# Patient Record
Sex: MALE | Race: BLACK OR AFRICAN AMERICAN | NOT HISPANIC OR LATINO | Employment: FULL TIME | URBAN - METROPOLITAN AREA
[De-identification: names, ages, dates, MRNs, and addresses within clinical notes are randomized per-mention and may not be internally consistent; named-entity substitution may affect disease eponyms.]

---

## 2017-01-12 ENCOUNTER — HOSPITAL ENCOUNTER (OUTPATIENT)
Dept: CARDIOLOGY | Facility: HOSPITAL | Age: 27
Discharge: HOME | End: 2017-01-12
Attending: FAMILY MEDICINE | Admitting: FAMILY MEDICINE

## 2017-01-12 VITALS
DIASTOLIC BLOOD PRESSURE: 72 MMHG | BODY MASS INDEX: 30.16 KG/M2 | WEIGHT: 235 LBS | SYSTOLIC BLOOD PRESSURE: 123 MMHG | HEIGHT: 74 IN

## 2017-01-12 PROCEDURE — 93306 TTE W/DOPPLER COMPLETE: CPT

## 2017-01-12 PROCEDURE — 93306 TTE W/DOPPLER COMPLETE: CPT | Performed by: INTERNAL MEDICINE

## 2017-01-16 DIAGNOSIS — R93.1 ABNORMAL ECHOCARDIOGRAM: Primary | ICD-10-CM

## 2017-02-20 ENCOUNTER — OFFICE VISIT (OUTPATIENT)
Dept: CARDIOLOGY | Facility: CLINIC | Age: 27
End: 2017-02-20

## 2017-02-20 VITALS
BODY MASS INDEX: 31.93 KG/M2 | DIASTOLIC BLOOD PRESSURE: 74 MMHG | SYSTOLIC BLOOD PRESSURE: 122 MMHG | HEART RATE: 56 BPM | HEIGHT: 74 IN | WEIGHT: 248.8 LBS

## 2017-02-20 DIAGNOSIS — I34.1 MVP (MITRAL VALVE PROLAPSE): Primary | ICD-10-CM

## 2017-02-20 PROCEDURE — 99203 OFFICE O/P NEW LOW 30 MIN: CPT | Performed by: INTERNAL MEDICINE

## 2017-02-20 PROCEDURE — 93000 ELECTROCARDIOGRAM COMPLETE: CPT | Performed by: INTERNAL MEDICINE

## 2017-02-20 NOTE — PROGRESS NOTES
Subjective:     Encounter Date:02/20/2017      Patient ID: Laron Huddleston is a 26 y.o. male.    Chief Complaint:Mitral Valve Prolapse (Consult )    PROBLEM LIST:  1. Mitral valve prolapse  a. 1/12/17 EF=55% left ventricle borderline dilated. Minimal billowing of the mitral valve anteroseptal leaflet with trace MR. Mild TR.  2. Asthma, childhood  3. Surgical history:  a. [place] teeth extraction  b. Left knee surgery      Allergies   Allergen Reactions   • Augmentin [Amoxicillin-Pot Clavulanate] Hives       No current outpatient prescriptions on file.        History of Present Illness    Patient presents today first option of cardiac care for mitral valve prolapse.  He notes that last year during a bout of dehydration he was diagnosed with mitral valve prolapse.  Overall he denies any significant cardiac symptoms.  Denies any chest pain, shortness of breath, syncope, near-syncope, edema, or orthopnea.  Notes that he is able to proceed with his usual activities without any limitations.  He had an echocardiogram performed earlier this year as noted above.  Very active no cardiovascular complaints.  The following portions of the patient's history were reviewed and updated as appropriate: allergies, current medications, past family history, past medical history, past social history, past surgical history and problem list.    Family History   Problem Relation Age of Onset   • No Known Problems Mother    • No Known Problems Father        Social History   Substance Use Topics   • Smoking status: Never Smoker   • Smokeless tobacco: Never Used   • Alcohol use No         Review of Systems   Constitution: Negative for fever, weakness and malaise/fatigue.   HENT: Negative for headaches and nosebleeds.    Eyes: Negative for redness and visual disturbance.   Cardiovascular: Negative for orthopnea, palpitations and paroxysmal nocturnal dyspnea.   Respiratory: Negative for cough, snoring, sputum production and wheezing.   "  Hematologic/Lymphatic: Negative for bleeding problem.   Skin: Negative for flushing, itching and rash.   Musculoskeletal: Negative for falls, joint pain and muscle cramps.   Gastrointestinal: Negative for abdominal pain, diarrhea, heartburn, nausea and vomiting.   Genitourinary: Negative for hematuria.   Neurological: Negative for excessive daytime sleepiness, dizziness and tremors.   Psychiatric/Behavioral: Negative for substance abuse. The patient is not nervous/anxious.           Objective:    height is 74\" (188 cm) and weight is 248 lb 12.8 oz (113 kg). His blood pressure is 122/74 and his pulse is 56.         Physical Exam   Constitutional: He is oriented to person, place, and time. He appears well-developed and well-nourished.   HENT:   Head: Normocephalic and atraumatic.   Mouth/Throat: Oropharynx is clear and moist.   Eyes: Conjunctivae are normal. Pupils are equal, round, and reactive to light.   Neck: Normal carotid pulses and no JVD present. Carotid bruit is not present. No thyromegaly present.   Cardiovascular: Normal rate, regular rhythm, S1 normal and S2 normal.  Exam reveals no gallop and no friction rub.    No murmur heard.  Pulses:       Carotid pulses are 2+ on the right side, and 2+ on the left side.       Dorsalis pedis pulses are 2+ on the right side, and 2+ on the left side.        Posterior tibial pulses are 2+ on the right side, and 2+ on the left side.   Pulmonary/Chest: No respiratory distress. He has no wheezes. He has no rales. He exhibits no tenderness.   Abdominal: He exhibits no distension, no abdominal bruit and no mass. There is no hepatosplenomegaly. There is no tenderness. There is no rebound.   Musculoskeletal: He exhibits no edema, tenderness or deformity.   Lymphadenopathy:     He has no cervical adenopathy.   Neurological: He is alert and oriented to person, place, and time. He has normal strength.   Skin: Skin is warm and dry. No rash noted. No cyanosis. Nails show no " clubbing.   Psychiatric: He has a normal mood and affect. Cognition and memory are normal.         ECG 12 Lead  Date/Time: 2/20/2017 5:30 PM  Performed by: DEN BLAND  Authorized by: DEN BLAND   Rhythm: sinus rhythm  ST Segments: ST segments normal  Clinical impression: normal ECG                  Assessment:   Assessment/Plan      Laron was seen today for mitral valve prolapse.    Diagnoses and all orders for this visit:    MVP (mitral valve prolapse)    Other orders  -     ECG 12 Lead      1.  Mitral valve prolapse.  Mild/trace regurgitation by echocardiogram.  Had a long discussion with the patient, and his mother today regarding the etiology, natural history, and prognosis of mitral valve prolapse.  We discussed no real treatment is necessary at this time given his asymptomatic status, and the fact that his valve is not look particularly thickened or myxomatous.  We have suggested he gets an echocardiogram in 3-5 years, have discussed the symptoms to look out for should he develop worsening mitral regurgitation.  He understands that by the morphology of his valve, which the echo was reviewed by myself today, he has a relatively benign prognosis.  Nonetheless this would still kelly some intermittent follow-up over his lifetime.  Appropriate physical activities were discussed. We will see him back in 3 years time or when necessary symptom change       Please note that portions of this note may have been completed with a voice recognition program. Efforts were made to edit the dictations, but occasionally words are mistranscribed.

## 2017-12-18 ENCOUNTER — OFFICE VISIT (OUTPATIENT)
Dept: FAMILY MEDICINE CLINIC | Facility: CLINIC | Age: 27
End: 2017-12-18

## 2017-12-18 VITALS
DIASTOLIC BLOOD PRESSURE: 76 MMHG | OXYGEN SATURATION: 98 % | BODY MASS INDEX: 30.42 KG/M2 | WEIGHT: 237 LBS | HEIGHT: 74 IN | HEART RATE: 70 BPM | SYSTOLIC BLOOD PRESSURE: 120 MMHG

## 2017-12-18 DIAGNOSIS — Z00.00 HEALTHCARE MAINTENANCE: Primary | ICD-10-CM

## 2017-12-18 PROBLEM — I34.1 MVP (MITRAL VALVE PROLAPSE): Status: RESOLVED | Noted: 2017-02-20 | Resolved: 2017-12-18

## 2017-12-18 LAB
25(OH)D3 SERPL-MCNC: 8.4 NG/ML
ALBUMIN SERPL-MCNC: 4.5 G/DL (ref 3.2–4.8)
ALBUMIN/GLOB SERPL: 1.6 G/DL (ref 1.5–2.5)
ALP SERPL-CCNC: 117 U/L (ref 25–100)
ALT SERPL W P-5'-P-CCNC: 34 U/L (ref 7–40)
ANION GAP SERPL CALCULATED.3IONS-SCNC: 8 MMOL/L (ref 3–11)
ARTICHOKE IGE QN: 127 MG/DL (ref 0–130)
AST SERPL-CCNC: 31 U/L (ref 0–33)
BASOPHILS # BLD AUTO: 0.01 10*3/MM3 (ref 0–0.2)
BASOPHILS NFR BLD AUTO: 0.1 % (ref 0–1)
BILIRUB BLD-MCNC: NEGATIVE MG/DL
BILIRUB SERPL-MCNC: 0.5 MG/DL (ref 0.3–1.2)
BUN BLD-MCNC: 15 MG/DL (ref 9–23)
BUN/CREAT SERPL: 12.5 (ref 7–25)
CALCIUM SPEC-SCNC: 9.5 MG/DL (ref 8.7–10.4)
CHLORIDE SERPL-SCNC: 105 MMOL/L (ref 99–109)
CHOLEST SERPL-MCNC: 199 MG/DL (ref 0–200)
CLARITY, POC: CLEAR
CO2 SERPL-SCNC: 25 MMOL/L (ref 20–31)
COLOR UR: YELLOW
CREAT BLD-MCNC: 1.2 MG/DL (ref 0.6–1.3)
CRP SERPL-MCNC: <0.01 MG/DL (ref 0–1)
DEPRECATED RDW RBC AUTO: 36.9 FL (ref 37–54)
EOSINOPHIL # BLD AUTO: 0.1 10*3/MM3 (ref 0–0.3)
EOSINOPHIL NFR BLD AUTO: 1.1 % (ref 0–3)
ERYTHROCYTE [DISTWIDTH] IN BLOOD BY AUTOMATED COUNT: 12.4 % (ref 11.3–14.5)
GFR SERPL CREATININE-BSD FRML MDRD: 89 ML/MIN/1.73
GLOBULIN UR ELPH-MCNC: 2.9 GM/DL
GLUCOSE BLD-MCNC: 102 MG/DL (ref 70–100)
GLUCOSE UR STRIP-MCNC: NEGATIVE MG/DL
HBA1C MFR BLD: 5.8 % (ref 4.8–5.6)
HCT VFR BLD AUTO: 44.9 % (ref 38.9–50.9)
HDLC SERPL-MCNC: 39 MG/DL (ref 40–60)
HGB BLD-MCNC: 14.6 G/DL (ref 13.1–17.5)
HIV1+2 AB SER QL: NORMAL
IMM GRANULOCYTES # BLD: 0.03 10*3/MM3 (ref 0–0.03)
IMM GRANULOCYTES NFR BLD: 0.3 % (ref 0–0.6)
KETONES UR QL: NEGATIVE
LEUKOCYTE EST, POC: NEGATIVE
LYMPHOCYTES # BLD AUTO: 2.94 10*3/MM3 (ref 0.6–4.8)
LYMPHOCYTES NFR BLD AUTO: 31 % (ref 24–44)
MCH RBC QN AUTO: 26.6 PG (ref 27–31)
MCHC RBC AUTO-ENTMCNC: 32.5 G/DL (ref 32–36)
MCV RBC AUTO: 81.9 FL (ref 80–99)
MONOCYTES # BLD AUTO: 0.56 10*3/MM3 (ref 0–1)
MONOCYTES NFR BLD AUTO: 5.9 % (ref 0–12)
NEUTROPHILS # BLD AUTO: 5.83 10*3/MM3 (ref 1.5–8.3)
NEUTROPHILS NFR BLD AUTO: 61.6 % (ref 41–71)
NITRITE UR-MCNC: NEGATIVE MG/ML
PH UR: 7 [PH] (ref 5–8)
PLATELET # BLD AUTO: 151 10*3/MM3 (ref 150–450)
PMV BLD AUTO: 11.2 FL (ref 6–12)
POTASSIUM BLD-SCNC: 4.3 MMOL/L (ref 3.5–5.5)
PROT SERPL-MCNC: 7.4 G/DL (ref 5.7–8.2)
PROT UR STRIP-MCNC: NEGATIVE MG/DL
RBC # BLD AUTO: 5.48 10*6/MM3 (ref 4.2–5.76)
RBC # UR STRIP: ABNORMAL /UL
SODIUM BLD-SCNC: 138 MMOL/L (ref 132–146)
SP GR UR: 1.02 (ref 1–1.03)
TRIGL SERPL-MCNC: 345 MG/DL (ref 0–150)
TSH SERPL DL<=0.05 MIU/L-ACNC: 1.21 MIU/ML (ref 0.35–5.35)
URATE SERPL-MCNC: 6.3 MG/DL (ref 3.7–9.2)
UROBILINOGEN UR QL: NORMAL
WBC NRBC COR # BLD: 9.47 10*3/MM3 (ref 3.5–10.8)

## 2017-12-18 PROCEDURE — 82306 VITAMIN D 25 HYDROXY: CPT | Performed by: FAMILY MEDICINE

## 2017-12-18 PROCEDURE — 90471 IMMUNIZATION ADMIN: CPT | Performed by: FAMILY MEDICINE

## 2017-12-18 PROCEDURE — 84550 ASSAY OF BLOOD/URIC ACID: CPT | Performed by: FAMILY MEDICINE

## 2017-12-18 PROCEDURE — 90686 IIV4 VACC NO PRSV 0.5 ML IM: CPT | Performed by: FAMILY MEDICINE

## 2017-12-18 PROCEDURE — 90472 IMMUNIZATION ADMIN EACH ADD: CPT | Performed by: FAMILY MEDICINE

## 2017-12-18 PROCEDURE — 99395 PREV VISIT EST AGE 18-39: CPT | Performed by: FAMILY MEDICINE

## 2017-12-18 PROCEDURE — 84443 ASSAY THYROID STIM HORMONE: CPT | Performed by: FAMILY MEDICINE

## 2017-12-18 PROCEDURE — 36415 COLL VENOUS BLD VENIPUNCTURE: CPT | Performed by: FAMILY MEDICINE

## 2017-12-18 PROCEDURE — 81003 URINALYSIS AUTO W/O SCOPE: CPT | Performed by: FAMILY MEDICINE

## 2017-12-18 PROCEDURE — 80061 LIPID PANEL: CPT | Performed by: FAMILY MEDICINE

## 2017-12-18 PROCEDURE — 80053 COMPREHEN METABOLIC PANEL: CPT | Performed by: FAMILY MEDICINE

## 2017-12-18 PROCEDURE — 90715 TDAP VACCINE 7 YRS/> IM: CPT | Performed by: FAMILY MEDICINE

## 2017-12-18 PROCEDURE — 85025 COMPLETE CBC W/AUTO DIFF WBC: CPT | Performed by: FAMILY MEDICINE

## 2017-12-18 PROCEDURE — G0432 EIA HIV-1/HIV-2 SCREEN: HCPCS | Performed by: FAMILY MEDICINE

## 2017-12-18 PROCEDURE — 86140 C-REACTIVE PROTEIN: CPT | Performed by: FAMILY MEDICINE

## 2017-12-18 PROCEDURE — 83036 HEMOGLOBIN GLYCOSYLATED A1C: CPT | Performed by: FAMILY MEDICINE

## 2017-12-18 NOTE — PROGRESS NOTES
Subjective   Laron Huddleston is a 27 y.o. male.     History of Present Illness   He is here for his annual fasting wellness evaluation.  He is amendable to updating his immunizations.  He voices no acute symptoms.    Review of Systems   Constitutional: Negative.    HENT: Negative.    Eyes: Negative.    Respiratory: Negative.    Cardiovascular: Negative.    Gastrointestinal: Negative.    Endocrine: Negative.    Genitourinary: Negative.    Musculoskeletal: Negative.    Skin: Negative.    Allergic/Immunologic: Negative.    Neurological: Negative.    Hematological: Negative.    Psychiatric/Behavioral: Negative.        Objective   Physical Exam   Constitutional: He is oriented to person, place, and time. He appears well-developed and well-nourished. He is cooperative.   HENT:   Head: Normocephalic and atraumatic.   Right Ear: Hearing and external ear normal.   Left Ear: Hearing and external ear normal.   Nose: Nose normal.   Mouth/Throat: Uvula is midline, oropharynx is clear and moist and mucous membranes are normal.   Eyes: Conjunctivae and EOM are normal. Pupils are equal, round, and reactive to light. No scleral icterus.   Neck: Trachea normal and normal range of motion. Neck supple. No JVD present. Carotid bruit is not present. No thyromegaly present.   Cardiovascular: Normal rate, regular rhythm, normal heart sounds and intact distal pulses.    Pulmonary/Chest: Effort normal and breath sounds normal.   Abdominal: Soft. Bowel sounds are normal. There is no hepatosplenomegaly. There is no tenderness.   Musculoskeletal: Normal range of motion.   Lymphadenopathy:     He has no cervical adenopathy.   Neurological: He is alert and oriented to person, place, and time. He has normal strength and normal reflexes. No sensory deficit. Gait normal.   Skin: Skin is warm and dry.   Psychiatric: He has a normal mood and affect. His speech is normal and behavior is normal. Judgment and thought content normal. Cognition and  memory are normal.   Nursing note and vitals reviewed.      Assessment/Plan   Diagnoses and all orders for this visit:    Healthcare maintenance  -     POC Urinalysis Dipstick, Automated  -     Comprehensive Metabolic Panel  -     CBC & Differential  -     Lipid Panel  -     TSH  -     Uric Acid  -     C-reactive Protein  -     HIV-1 / O / 2 Ag / Antibody 4th Generation  -     Vitamin D 25 Hydroxy  -     Hemoglobin A1c  -     Tdap Vaccine Greater Than or Equal To 8yo IM  -     Flu Vaccine Quad PF 3YR+  - Vaccine counseling and current VIS is provided for immunizations administered today.    The patient is here for a health maintenance visit.  Currently, the patient consumes a healthy diet and has an adequate exercise regimen. Screening lab work is ordered.  Immunizations are updated today.  Advice and education is given regarding nutrition, aerobic exercise, routine dental evaluations, routine eye exams, reproductive health, cardiovascular risk reduction, sunscreen use, self skin examination (annual dermatology evaluations) and seat belt use (general overall safety).  Further recommendations after lab evaluation.  Annual wellness evaluations recommended.

## 2018-06-19 ENCOUNTER — OFFICE VISIT (OUTPATIENT)
Dept: FAMILY MEDICINE CLINIC | Facility: CLINIC | Age: 28
End: 2018-06-19

## 2018-06-19 VITALS
WEIGHT: 222.8 LBS | DIASTOLIC BLOOD PRESSURE: 70 MMHG | HEIGHT: 74 IN | RESPIRATION RATE: 18 BRPM | HEART RATE: 63 BPM | SYSTOLIC BLOOD PRESSURE: 116 MMHG | OXYGEN SATURATION: 97 % | BODY MASS INDEX: 28.59 KG/M2

## 2018-06-19 DIAGNOSIS — M79.10 MYALGIA: Primary | ICD-10-CM

## 2018-06-19 LAB
ALBUMIN SERPL-MCNC: 4.29 G/DL (ref 3.2–4.8)
ALBUMIN/GLOB SERPL: 1.5 G/DL (ref 1.5–2.5)
ALP SERPL-CCNC: 116 U/L (ref 25–100)
ALT SERPL W P-5'-P-CCNC: 33 U/L (ref 7–40)
ANION GAP SERPL CALCULATED.3IONS-SCNC: 6 MMOL/L (ref 3–11)
AST SERPL-CCNC: 49 U/L (ref 0–33)
BASOPHILS # BLD AUTO: 0.02 10*3/MM3 (ref 0–0.2)
BASOPHILS NFR BLD AUTO: 0.5 % (ref 0–1)
BILIRUB SERPL-MCNC: 0.5 MG/DL (ref 0.3–1.2)
BUN BLD-MCNC: 15 MG/DL (ref 9–23)
BUN/CREAT SERPL: 14.6 (ref 7–25)
CALCIUM SPEC-SCNC: 9.2 MG/DL (ref 8.7–10.4)
CHLORIDE SERPL-SCNC: 107 MMOL/L (ref 99–109)
CK SERPL-CCNC: 715 U/L (ref 26–174)
CO2 SERPL-SCNC: 26 MMOL/L (ref 20–31)
CREAT BLD-MCNC: 1.03 MG/DL (ref 0.6–1.3)
CRP SERPL-MCNC: 0.02 MG/DL (ref 0–1)
DEPRECATED RDW RBC AUTO: 37.6 FL (ref 37–54)
EOSINOPHIL # BLD AUTO: 0.1 10*3/MM3 (ref 0–0.3)
EOSINOPHIL NFR BLD AUTO: 2.3 % (ref 0–3)
ERYTHROCYTE [DISTWIDTH] IN BLOOD BY AUTOMATED COUNT: 12.4 % (ref 11.3–14.5)
ERYTHROCYTE [SEDIMENTATION RATE] IN BLOOD: 15 MM/HR (ref 0–15)
FERRITIN SERPL-MCNC: 118 NG/ML (ref 22–322)
GFR SERPL CREATININE-BSD FRML MDRD: 106 ML/MIN/1.73
GLOBULIN UR ELPH-MCNC: 2.9 GM/DL
GLUCOSE BLD-MCNC: 83 MG/DL (ref 70–100)
HCT VFR BLD AUTO: 43.1 % (ref 38.9–50.9)
HGB BLD-MCNC: 13.7 G/DL (ref 13.1–17.5)
IMM GRANULOCYTES # BLD: 0.01 10*3/MM3 (ref 0–0.03)
IMM GRANULOCYTES NFR BLD: 0.2 % (ref 0–0.6)
LYMPHOCYTES # BLD AUTO: 1.72 10*3/MM3 (ref 0.6–4.8)
LYMPHOCYTES NFR BLD AUTO: 39.1 % (ref 24–44)
MCH RBC QN AUTO: 26.2 PG (ref 27–31)
MCHC RBC AUTO-ENTMCNC: 31.8 G/DL (ref 32–36)
MCV RBC AUTO: 82.4 FL (ref 80–99)
MONOCYTES # BLD AUTO: 0.52 10*3/MM3 (ref 0–1)
MONOCYTES NFR BLD AUTO: 11.8 % (ref 0–12)
NEUTROPHILS # BLD AUTO: 2.04 10*3/MM3 (ref 1.5–8.3)
NEUTROPHILS NFR BLD AUTO: 46.3 % (ref 41–71)
PLATELET # BLD AUTO: 140 10*3/MM3 (ref 150–450)
PMV BLD AUTO: 11.7 FL (ref 6–12)
POTASSIUM BLD-SCNC: 4.4 MMOL/L (ref 3.5–5.5)
PROT SERPL-MCNC: 7.2 G/DL (ref 5.7–8.2)
RBC # BLD AUTO: 5.23 10*6/MM3 (ref 4.2–5.76)
SODIUM BLD-SCNC: 139 MMOL/L (ref 132–146)
TSH SERPL DL<=0.05 MIU/L-ACNC: 0.95 MIU/ML (ref 0.35–5.35)
URATE SERPL-MCNC: 5.2 MG/DL (ref 3.7–9.2)
WBC NRBC COR # BLD: 4.4 10*3/MM3 (ref 3.5–10.8)

## 2018-06-19 PROCEDURE — 80053 COMPREHEN METABOLIC PANEL: CPT | Performed by: FAMILY MEDICINE

## 2018-06-19 PROCEDURE — 82550 ASSAY OF CK (CPK): CPT | Performed by: FAMILY MEDICINE

## 2018-06-19 PROCEDURE — 86200 CCP ANTIBODY: CPT | Performed by: FAMILY MEDICINE

## 2018-06-19 PROCEDURE — 99214 OFFICE O/P EST MOD 30 MIN: CPT | Performed by: FAMILY MEDICINE

## 2018-06-19 PROCEDURE — 86140 C-REACTIVE PROTEIN: CPT | Performed by: FAMILY MEDICINE

## 2018-06-19 PROCEDURE — 85025 COMPLETE CBC W/AUTO DIFF WBC: CPT | Performed by: FAMILY MEDICINE

## 2018-06-19 PROCEDURE — 86038 ANTINUCLEAR ANTIBODIES: CPT | Performed by: FAMILY MEDICINE

## 2018-06-19 PROCEDURE — 84443 ASSAY THYROID STIM HORMONE: CPT | Performed by: FAMILY MEDICINE

## 2018-06-19 PROCEDURE — 82728 ASSAY OF FERRITIN: CPT | Performed by: FAMILY MEDICINE

## 2018-06-19 PROCEDURE — 84550 ASSAY OF BLOOD/URIC ACID: CPT | Performed by: FAMILY MEDICINE

## 2018-06-19 PROCEDURE — 86665 EPSTEIN-BARR CAPSID VCA: CPT | Performed by: FAMILY MEDICINE

## 2018-06-19 PROCEDURE — 86430 RHEUMATOID FACTOR TEST QUAL: CPT | Performed by: FAMILY MEDICINE

## 2018-06-19 PROCEDURE — 85652 RBC SED RATE AUTOMATED: CPT | Performed by: FAMILY MEDICINE

## 2018-06-19 PROCEDURE — 86618 LYME DISEASE ANTIBODY: CPT | Performed by: FAMILY MEDICINE

## 2018-06-19 PROCEDURE — 86664 EPSTEIN-BARR NUCLEAR ANTIGEN: CPT | Performed by: FAMILY MEDICINE

## 2018-06-19 PROCEDURE — 86663 EPSTEIN-BARR ANTIBODY: CPT | Performed by: FAMILY MEDICINE

## 2018-06-19 RX ORDER — DICLOFENAC SODIUM 75 MG/1
75 TABLET, DELAYED RELEASE ORAL 2 TIMES DAILY
Qty: 60 TABLET | Refills: 0 | Status: SHIPPED | OUTPATIENT
Start: 2018-06-19 | End: 2018-10-16

## 2018-06-19 NOTE — PROGRESS NOTES
Subjective   Laron Huddleston is a 27 y.o. male.     History of Present Illness   He reports about two weeks of myalgias.  He describes associated morning stiffness and diffuse aches to his shoulders, back and thighs. He denies overuse and reports daily adequate hydration.   He recalls no recent fever, illness, sore throat or rashes.  He denies recalled insect bites or visualizing ticks.  He reports no family history of autoimmune disease.  He denies brown urine, lightheadedness or skin rashes.  There has been no n/v/d.  There has been no joint swelling or redness.  Nothing seems to make it better and routine work outs make it worse.    Review of Systems   Constitutional: Positive for fatigue. Negative for appetite change, chills, fever and unexpected weight change.   HENT: Positive for congestion and postnasal drip. Negative for mouth sores, nosebleeds and trouble swallowing.    Eyes: Negative for visual disturbance.   Respiratory: Negative for shortness of breath.    Cardiovascular: Negative for chest pain, palpitations and leg swelling.   Gastrointestinal: Negative for abdominal pain, diarrhea, nausea and vomiting.   Endocrine: Negative for polydipsia, polyphagia and polyuria.   Genitourinary: Negative for discharge, dysuria and hematuria.   Musculoskeletal: Positive for myalgias.   Skin: Negative for rash.   Neurological: Negative for light-headedness.   Hematological: Does not bruise/bleed easily.   Psychiatric/Behavioral: The patient is not nervous/anxious.        Objective   Physical Exam   Constitutional: He is oriented to person, place, and time. He appears well-developed and well-nourished. He is cooperative.   HENT:   Head: Normocephalic and atraumatic.   Right Ear: Hearing and external ear normal.   Left Ear: Hearing and external ear normal.   Nose: Nose normal.   Mouth/Throat: Uvula is midline, oropharynx is clear and moist and mucous membranes are normal.   Eyes: Conjunctivae and EOM are normal.  Pupils are equal, round, and reactive to light. No scleral icterus.   Neck: Trachea normal and normal range of motion. Neck supple. No JVD present. Carotid bruit is not present. No thyromegaly present.   Cardiovascular: Normal rate, regular rhythm, normal heart sounds and intact distal pulses.    Pulmonary/Chest: Effort normal and breath sounds normal.   Abdominal: Soft. Bowel sounds are normal. There is no hepatosplenomegaly. There is no tenderness.   Musculoskeletal: Normal range of motion.   Lymphadenopathy:     He has no cervical adenopathy.   Neurological: He is alert and oriented to person, place, and time. He has normal strength and normal reflexes. No sensory deficit. Gait normal.   Skin: Skin is warm and dry.   Psychiatric: He has a normal mood and affect. His speech is normal and behavior is normal. Judgment and thought content normal. Cognition and memory are normal.   Nursing note and vitals reviewed.      Assessment/Plan   Diagnoses and all orders for this visit:    Myalgia  -     diclofenac (VOLTAREN) 75 MG EC tablet; Take 1 tablet by mouth 2 (Two) Times a Day.  -     POC Urinalysis Dipstick, Automated  -     Uric Acid  -     C-reactive Protein  -     RASTA  -     Sedimentation Rate  -     CK  -     Comprehensive Metabolic Panel  -     CBC & Differential  -     TSH  -     Ferritin  -     Rheumatoid Factor  -     Cyclic Citrul Peptide Antibody, IgG / IgA  -     Franny-Barr Virus VCA Antibody Panel  -     Lyme, Total Antibody Test / Reflex  - Daily hydration  - Diary of symptoms  - Further recommendations pending lab evaluation.

## 2018-06-20 LAB
ANA SER QL: NEGATIVE
B BURGDOR IGG+IGM SER-ACNC: <0.91 ISR (ref 0–0.9)
EBV EA IGG SER-ACNC: 82.8 U/ML (ref 0–8.9)
EBV NA IGG SER IA-ACNC: >600 U/ML (ref 0–17.9)
EBV VCA IGG SER-ACNC: 404 U/ML (ref 0–17.9)
EBV VCA IGM SER-ACNC: <36 U/ML (ref 0–35.9)
INTERPRETATION: ABNORMAL
RHEUMATOID FACT SERPL-ACNC: NEGATIVE [IU]/ML

## 2018-06-21 LAB — CCP IGA+IGG SERPL IA-ACNC: 6 UNITS (ref 0–19)

## 2018-10-16 ENCOUNTER — OFFICE VISIT (OUTPATIENT)
Dept: FAMILY MEDICINE CLINIC | Facility: CLINIC | Age: 28
End: 2018-10-16

## 2018-10-16 VITALS
SYSTOLIC BLOOD PRESSURE: 112 MMHG | RESPIRATION RATE: 18 BRPM | HEIGHT: 74 IN | BODY MASS INDEX: 28.59 KG/M2 | HEART RATE: 62 BPM | OXYGEN SATURATION: 98 % | WEIGHT: 222.8 LBS | DIASTOLIC BLOOD PRESSURE: 76 MMHG

## 2018-10-16 DIAGNOSIS — B36.0 TINEA VERSICOLOR: Primary | ICD-10-CM

## 2018-10-16 PROCEDURE — 99213 OFFICE O/P EST LOW 20 MIN: CPT | Performed by: FAMILY MEDICINE

## 2018-10-16 RX ORDER — KETOCONAZOLE 20 MG/ML
SHAMPOO TOPICAL
Qty: 120 ML | Refills: 0 | Status: SHIPPED | OUTPATIENT
Start: 2018-10-16 | End: 2020-03-24

## 2018-10-16 NOTE — PROGRESS NOTES
Subjective   Laron Huddleston is a 27 y.o. male.     History of Present Illness   He describes a discoloration to his upper back and neck over several weeks not resolving with over the counter cream.  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  It is occasionally itchy.  He voices no other symptoms.    Review of Systems   Constitutional: Negative for chills and fever.   Skin: Positive for rash.       Objective   Physical Exam   Constitutional: He is oriented to person, place, and time. He appears well-developed and well-nourished.   HENT:   Head: Normocephalic and atraumatic.   Right Ear: Hearing normal.   Left Ear: Hearing normal.   Mouth/Throat: Mucous membranes are normal.   Eyes: Pupils are equal, round, and reactive to light. Conjunctivae and EOM are normal.   Neck: Neck supple. No JVD present. No thyromegaly present.   Cardiovascular: Normal rate, regular rhythm and normal heart sounds.    Pulmonary/Chest: Effort normal and breath sounds normal.   Musculoskeletal: Normal range of motion.   Neurological: He is alert and oriented to person, place, and time. He has normal strength. No sensory deficit. Gait normal.   Skin: Skin is warm and dry.   Psychiatric: He has a normal mood and affect. His behavior is normal. Judgment and thought content normal. Cognition and memory are normal.   Nursing note and vitals reviewed.      Assessment/Plan   Diagnoses and all orders for this visit:    Tinea versicolor  -     ketoconazole 2 % shampoo; Apply to affected, moisten skin once daily for a week, leave on for 5 minutes then rinse off.  - We discussed topical care   - Dry shirts after work outs  - See dermatology if not improved.

## 2020-03-24 ENCOUNTER — NURSE TRIAGE (OUTPATIENT)
Dept: CALL CENTER | Facility: HOSPITAL | Age: 30
End: 2020-03-24

## 2020-03-24 NOTE — TELEPHONE ENCOUNTER
"    Reason for Disposition  • [1] Fever or feeling feverish AND [2] within 14 Days of COVID-19 EXPOSURE (Close Contact)    Additional Information  • Negative: Severe difficulty breathing (e.g., struggling for each breath, speak in single words, bluish lips)  • Negative: Sounds like a life-threatening emergency to the triager  • Negative: [1] Difficulty breathing (shortness of breath) occurs AND [2] onset > 14 days after COVID-19 EXPOSURE (Close Contact)  • Negative: [1] Dry cough occurs AND [2] onset > 14 days after COVID-19 EXPOSURE  • Negative: [1] Wet cough (i.e., white-yellow, yellow, green, or camilla colored sputum) AND [2] onset > 14 days after COVID-19 EXPOSURE  • Negative: [1] Common cold symptoms AND [2] onset > 14 days after COVID-19 EXPOSURE  • Negative: [1] Difficulty breathing occurs AND [2] within 14 days of COVID-19 EXPOSURE (Close Contact)  • Negative: Patient sounds very sick or weak to the triager    Answer Assessment - Initial Assessment Questions  1. 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  2. PLACE of CONTACT: \"Where were you when you were exposed to COVID-19  (coronavirus disease 2019)?\" (e.g., city, state, country)      [place], [place]  3. TYPE of CONTACT: \"How much contact was there?\" (e.g., live in same house, work in same office, same school)      Orderly in the OR  4. DATE of CONTACT: \"When did you have contact with a coronavirus patient?\" (e.g., days)      Has worked multiple days in the last 2 weeks  5. DURATION of CONTACT: \"How long were you in contact with the COVID-19 (coronavirus disease) patient?\" (e.g., a few seconds, passed by person, a few minutes, live with the patient)      Unknown  6. SYMPTOMS: \"Do you have any symptoms?\" (e.g., fever, cough, breathing difficulty)      Fever -highest 100.7 early this morning. Cough-dry, mild, no breathing issues, body aches back, side " "of arms.  7. PREGNANCY OR POSTPARTUM: \"Is there any chance you are pregnant?\" \"When was your last menstrual period?\" \"Did you deliver in the last 2 weeks?\"      n/a  8. HIGH RISK: \"Do you have any heart or lung problems? Do you have a weakened immune system?\" (e.g., CHF, COPD, asthma, HIV positive, chemotherapy, renal failure, diabetes mellitus, sickle cell anemia)      Asthma and no other medical issues.  Jaw surgery a year ago.    Protocols used: CORONAVIRUS (COVID-19) EXPOSURE-ADULT-AH      "

## 2021-01-22 ENCOUNTER — IMMUNIZATION (OUTPATIENT)
Dept: VACCINE CLINIC | Facility: HOSPITAL | Age: 31
End: 2021-01-22

## 2021-01-22 PROCEDURE — 0001A: CPT | Performed by: INTERNAL MEDICINE

## 2021-01-22 PROCEDURE — 91300 HC SARSCOV02 VAC 30MCG/0.3ML IM: CPT | Performed by: INTERNAL MEDICINE

## 2021-02-12 ENCOUNTER — IMMUNIZATION (OUTPATIENT)
Dept: VACCINE CLINIC | Facility: HOSPITAL | Age: 31
End: 2021-02-12

## 2021-02-12 PROCEDURE — 0002A: CPT | Performed by: INTERNAL MEDICINE

## 2021-02-12 PROCEDURE — 91300 HC SARSCOV02 VAC 30MCG/0.3ML IM: CPT | Performed by: INTERNAL MEDICINE

## 2021-08-11 ENCOUNTER — LAB REQUISITION (OUTPATIENT)
Dept: LAB | Facility: HOSPITAL | Age: 31
End: 2021-08-11

## 2021-08-11 DIAGNOSIS — Z01.84 ENCOUNTER FOR ANTIBODY RESPONSE EXAMINATION: ICD-10-CM

## 2021-08-11 PROCEDURE — 86658 ENTEROVIRUS ANTIBODY: CPT | Performed by: ANESTHESIOLOGY

## 2021-08-22 LAB
PV1 NAB TITR SER NT: NORMAL {TITER}
PV3 NAB TITR SER NT: NORMAL {TITER}

## 2023-12-12 ENCOUNTER — OFFICE VISIT (OUTPATIENT)
Dept: FAMILY MEDICINE CLINIC | Facility: CLINIC | Age: 33
End: 2023-12-12
Payer: COMMERCIAL

## 2023-12-12 VITALS
HEART RATE: 67 BPM | HEIGHT: 74 IN | DIASTOLIC BLOOD PRESSURE: 62 MMHG | SYSTOLIC BLOOD PRESSURE: 120 MMHG | BODY MASS INDEX: 28.36 KG/M2 | WEIGHT: 221 LBS | RESPIRATION RATE: 18 BRPM | OXYGEN SATURATION: 97 %

## 2023-12-12 DIAGNOSIS — Z00.00 ANNUAL PHYSICAL EXAM: Primary | ICD-10-CM

## 2023-12-12 DIAGNOSIS — R93.89 ABNORMAL CHEST X-RAY: ICD-10-CM

## 2023-12-12 DIAGNOSIS — Z13.228 ENCOUNTER FOR SCREENING FOR OTHER METABOLIC DISORDERS: ICD-10-CM

## 2023-12-12 DIAGNOSIS — Z13.220 SCREENING FOR HYPERLIPIDEMIA: ICD-10-CM

## 2023-12-12 PROBLEM — R59.0 CERVICAL LYMPHADENOPATHY: Status: ACTIVE | Noted: 2023-03-13

## 2023-12-12 LAB
BASOPHILS # BLD AUTO: 0.02 10*3/MM3 (ref 0–0.2)
BASOPHILS NFR BLD AUTO: 0.3 % (ref 0–1.5)
EOSINOPHIL # BLD AUTO: 0.11 10*3/MM3 (ref 0–0.4)
EOSINOPHIL NFR BLD AUTO: 1.5 % (ref 0.3–6.2)
ERYTHROCYTE [DISTWIDTH] IN BLOOD BY AUTOMATED COUNT: 12.5 % (ref 12.3–15.4)
HCT VFR BLD AUTO: 41.1 % (ref 37.5–51)
HGB BLD-MCNC: 13.5 G/DL (ref 13–17.7)
IMM GRANULOCYTES # BLD AUTO: 0.03 10*3/MM3 (ref 0–0.05)
IMM GRANULOCYTES NFR BLD AUTO: 0.4 % (ref 0–0.5)
LYMPHOCYTES # BLD AUTO: 2.48 10*3/MM3 (ref 0.7–3.1)
LYMPHOCYTES NFR BLD AUTO: 32.8 % (ref 19.6–45.3)
MCH RBC QN AUTO: 26.8 PG (ref 26.6–33)
MCHC RBC AUTO-ENTMCNC: 32.8 G/DL (ref 31.5–35.7)
MCV RBC AUTO: 81.7 FL (ref 79–97)
MONOCYTES # BLD AUTO: 0.52 10*3/MM3 (ref 0.1–0.9)
MONOCYTES NFR BLD AUTO: 6.9 % (ref 5–12)
NEUTROPHILS # BLD AUTO: 4.4 10*3/MM3 (ref 1.7–7)
NEUTROPHILS NFR BLD AUTO: 58.1 % (ref 42.7–76)
NRBC BLD AUTO-RTO: 0 /100 WBC (ref 0–0.2)
PLATELET # BLD AUTO: 153 10*3/MM3 (ref 140–450)
RBC # BLD AUTO: 5.03 10*6/MM3 (ref 4.14–5.8)
WBC # BLD AUTO: 7.56 10*3/MM3 (ref 3.4–10.8)

## 2023-12-12 NOTE — PROGRESS NOTES
Preventive Exam    History of Present Illness: Laron Huddleston is a 33 y.o. here for check up and review of routine health maintenance. he states he is doing well and has the following concerns.  He was seen on 11/12/2023 at [place] for bronchitis. He received CXR and this was abnormal. CT of chest with contrast was recommended. He is also in need of referral to pulmonology.     Patient is new to office and new to me. He has history of MVR.     Past medical history, surgical history and family history have been reviewed.       Review of Systems   Constitutional:  Negative for appetite change, chills, diaphoresis, fatigue and fever.   HENT:  Positive for congestion and rhinorrhea. Negative for dental problem, hearing loss, sinus pressure and sore throat.         Dental exam is up to date.    Eyes: Negative.  Negative for blurred vision, double vision, photophobia and visual disturbance.        Wears glasses and contacts. Eye exam is up to date.    Respiratory:  Negative for cough, chest tightness, shortness of breath and wheezing.    Cardiovascular:  Negative for chest pain, palpitations and leg swelling.   Gastrointestinal:  Negative for abdominal pain, constipation, diarrhea, nausea and vomiting.   Endocrine: Negative.  Negative for cold intolerance and heat intolerance.   Genitourinary: Negative.  Negative for decreased libido, decreased urine volume, difficulty urinating, dysuria, genital sores, hematuria, nocturia, penile pain and testicular pain.   Musculoskeletal:  Negative for arthralgias, back pain, joint swelling and myalgias.   Skin: Negative.    Allergic/Immunologic: Negative.  Negative for environmental allergies and food allergies.   Neurological:  Negative for dizziness, weakness, numbness and headache.   Hematological: Negative.  Does not bruise/bleed easily.   Psychiatric/Behavioral: Negative.  Negative for suicidal ideas, depressed mood and stress. The patient is not nervous/anxious.         PHYSICAL EXAM    Vitals:    12/12/23 0902   BP: 120/62   Pulse: 67   Resp: 18   SpO2: 97%       Body mass index is 28.37 kg/m².   BMI is >= 25 and <30. (Overweight) The following options were offered after discussion;: none (medical contraindication)       Physical Exam  Vitals and nursing note reviewed.   Constitutional:       Appearance: Normal appearance. He is well-developed and normal weight.   HENT:      Head: Normocephalic and atraumatic.      Right Ear: Tympanic membrane, ear canal and external ear normal.      Left Ear: Tympanic membrane, ear canal and external ear normal.      Nose: Nose normal.      Mouth/Throat:      Lips: Pink.      Mouth: Mucous membranes are moist.      Tongue: No lesions.      Palate: No mass and lesions.      Pharynx: Oropharynx is clear. Uvula midline.      Tonsils: No tonsillar exudate.   Eyes:      Conjunctiva/sclera: Conjunctivae normal.      Pupils: Pupils are equal, round, and reactive to light.   Neck:      Thyroid: No thyromegaly.   Cardiovascular:      Rate and Rhythm: Normal rate and regular rhythm.      Pulses: Normal pulses.           Dorsalis pedis pulses are 2+ on the right side and 2+ on the left side.        Posterior tibial pulses are 2+ on the right side and 2+ on the left side.      Heart sounds: Normal heart sounds. No murmur heard.  Pulmonary:      Effort: Pulmonary effort is normal.      Breath sounds: Normal breath sounds.   Abdominal:      General: Bowel sounds are normal. There is no distension.      Palpations: Abdomen is soft.      Tenderness: There is no abdominal tenderness.   Musculoskeletal:         General: No deformity. Normal range of motion.      Cervical back: Normal range of motion and neck supple.      Right lower leg: No edema.      Left lower leg: No edema.   Lymphadenopathy:      Head:      Right side of head: No submental, submandibular, tonsillar, preauricular, posterior auricular or occipital adenopathy.      Left side of head: No  submental, submandibular, tonsillar, preauricular, posterior auricular or occipital adenopathy.      Cervical: No cervical adenopathy.      Right cervical: No superficial, deep or posterior cervical adenopathy.     Left cervical: No superficial, deep or posterior cervical adenopathy.      Upper Body:      Right upper body: No supraclavicular adenopathy.      Left upper body: No supraclavicular adenopathy.   Skin:     General: Skin is warm and dry.      Capillary Refill: Capillary refill takes 2 to 3 seconds.   Neurological:      General: No focal deficit present.      Mental Status: He is alert and oriented to person, place, and time.      Cranial Nerves: No cranial nerve deficit.      Sensory: Sensation is intact.      Motor: Motor function is intact.      Coordination: Coordination is intact.      Gait: Gait is intact.   Psychiatric:         Attention and Perception: Attention and perception normal.         Mood and Affect: Mood and affect normal.         Speech: Speech normal.         Behavior: Behavior normal. Behavior is cooperative.         Thought Content: Thought content normal.         Cognition and Memory: Cognition and memory normal.         Judgment: Judgment normal.         Procedures    Diagnoses and all orders for this visit:    1. Annual physical exam (Primary)    2. Abnormal chest x-ray  -     CT Chest Low Dose Follow Up With Contrast; Future  -     Ambulatory Referral to Pulmonology    3. Screening for hyperlipidemia  -     Lipid Panel With / Chol / HDL Ratio    4. Encounter for screening for other metabolic disorders  -     CBC & Differential  -     Comprehensive Metabolic Panel        Problems Addressed this Visit    None  Visit Diagnoses       Annual physical exam    -  Primary    Abnormal chest x-ray        Relevant Orders    CT Chest Low Dose Follow Up With Contrast    Ambulatory Referral to Pulmonology    Screening for hyperlipidemia        Relevant Orders    Lipid Panel With / Chol / HDL  Ratio    Encounter for screening for other metabolic disorders        Relevant Orders    CBC & Differential    Comprehensive Metabolic Panel          Diagnoses         Codes Comments    Annual physical exam    -  Primary ICD-10-CM: Z00.00  ICD-9-CM: V70.0     Abnormal chest x-ray     ICD-10-CM: R93.89  ICD-9-CM: 793.2     Screening for hyperlipidemia     ICD-10-CM: Z13.220  ICD-9-CM: V77.91     Encounter for screening for other metabolic disorders     ICD-10-CM: Z13.228  ICD-9-CM: V77.99           CT Chest low dose with contrast  Referral to pulmonology  Lipid panel  CBC  CMP    Routine health maintenance reviewed and discussed with Laron Huddleston.    Preventative counseling regarding healthy diet and exercise.   Pt reports that he wears a seatbelt regularly.   Return in about 1 month (around 1/12/2024) for Annual, Labs.

## 2023-12-12 NOTE — PATIENT INSTRUCTIONS
I will call call or send kooaba message with your lab results.   Please call with any questions or concerns.    Return in about 1 month (around 1/12/2024) for Annual, Labs.

## 2023-12-13 LAB
ALBUMIN SERPL-MCNC: 4.6 G/DL (ref 3.5–5.2)
ALBUMIN/GLOB SERPL: 1.6 G/DL
ALP SERPL-CCNC: 111 U/L (ref 39–117)
ALT SERPL-CCNC: 47 U/L (ref 1–41)
AST SERPL-CCNC: 69 U/L (ref 1–40)
BILIRUB SERPL-MCNC: 0.2 MG/DL (ref 0–1.2)
BUN SERPL-MCNC: 13 MG/DL (ref 6–20)
BUN/CREAT SERPL: 11.8 (ref 7–25)
CALCIUM SERPL-MCNC: 9.8 MG/DL (ref 8.6–10.5)
CHLORIDE SERPL-SCNC: 103 MMOL/L (ref 98–107)
CHOLEST SERPL-MCNC: 168 MG/DL (ref 0–200)
CHOLEST/HDLC SERPL: 4 {RATIO}
CO2 SERPL-SCNC: 27.1 MMOL/L (ref 22–29)
CREAT SERPL-MCNC: 1.1 MG/DL (ref 0.76–1.27)
EGFRCR SERPLBLD CKD-EPI 2021: 91.5 ML/MIN/1.73
GLOBULIN SER CALC-MCNC: 2.8 GM/DL
GLUCOSE SERPL-MCNC: 81 MG/DL (ref 65–99)
HDLC SERPL-MCNC: 42 MG/DL (ref 40–60)
LDLC SERPL CALC-MCNC: 111 MG/DL (ref 0–100)
POTASSIUM SERPL-SCNC: 4.3 MMOL/L (ref 3.5–5.2)
PROT SERPL-MCNC: 7.4 G/DL (ref 6–8.5)
SODIUM SERPL-SCNC: 139 MMOL/L (ref 136–145)
TRIGL SERPL-MCNC: 81 MG/DL (ref 0–150)
VLDLC SERPL CALC-MCNC: 15 MG/DL (ref 5–40)